# Patient Record
Sex: FEMALE | NOT HISPANIC OR LATINO | Employment: FULL TIME | URBAN - METROPOLITAN AREA
[De-identification: names, ages, dates, MRNs, and addresses within clinical notes are randomized per-mention and may not be internally consistent; named-entity substitution may affect disease eponyms.]

---

## 2024-02-27 ENCOUNTER — OFFICE VISIT (OUTPATIENT)
Dept: OBSTETRICS AND GYNECOLOGY | Facility: CLINIC | Age: 44
End: 2024-02-27
Payer: COMMERCIAL

## 2024-02-27 VITALS
HEIGHT: 70 IN | WEIGHT: 180 LBS | SYSTOLIC BLOOD PRESSURE: 110 MMHG | DIASTOLIC BLOOD PRESSURE: 86 MMHG | BODY MASS INDEX: 25.77 KG/M2

## 2024-02-27 DIAGNOSIS — Z01.419 WELL WOMAN EXAM WITH ROUTINE GYNECOLOGICAL EXAM: Primary | ICD-10-CM

## 2024-02-27 DIAGNOSIS — Z12.31 BREAST CANCER SCREENING BY MAMMOGRAM: ICD-10-CM

## 2024-02-27 PROCEDURE — 99396 PREV VISIT EST AGE 40-64: CPT | Performed by: OBSTETRICS & GYNECOLOGY

## 2024-02-27 PROCEDURE — 1036F TOBACCO NON-USER: CPT | Performed by: OBSTETRICS & GYNECOLOGY

## 2024-02-27 RX ORDER — MULTIVITAMIN
TABLET ORAL
COMMUNITY

## 2024-02-27 RX ORDER — CHOLECALCIFEROL (VITAMIN D3) 50 MCG
TABLET ORAL
COMMUNITY

## 2024-02-27 NOTE — PROGRESS NOTES
"Subjective   Chinaludivina Nguyen is a 43 y.o. female who is here for a routine well woman exam.     PAP: 2/11/22 NEG HPV NEG   6/25/18 NEG HPV NEG  COLPO 1/8/16 TRACIE 1  MAMMO 3/28/22 NEG  DEXA NEVER  COLON NEVER     Doing well, no significant medical changes since last year.  Still working at [employer].  Kids are doing well.    Periods are still regular, not too heavy other than first day. No pain.   has vasectomy.    No new breast changes.      ROS:  Constitutional: Negative  Eyes: Negative  HEENT: Negative  Respiratory: Negative  Cardiovascular: Negative  GI: Negative  : Negative  Endocrine: Negative  Breast: Negative  Musculoskeletal: Negative  Skin: Negative  Hematologic: Negative  Neurologic: Negative  Psychiatric: Negative     Objective   /86   Ht 1.778 m (5' 10\")   Wt 81.6 kg (180 lb)   LMP 02/20/2024 (Exact Date)   BMI 25.83 kg/m²     Constitutional: Alert and in no acute distress.   Pulmonary: No respiratory distress.   Chest: Breasts: Normal appearance, no nipple discharge, and no skin changes. Palpation of breasts and axillae: No palpable mass and no axillary lymphadenopathy.  Abdomen: Soft, nontender; no abdominal mass palpated.   Genitourinary:   External genitalia: Normal appearance.  No inguinal lymphadenopathy.   Bartholin's, Urethral, and Skenes Glands: Normal.   Urethra: Normal. Bladder: Normal on palpation.   Vagina: Normal.   Cervix: Normal appearance, nontender.   Uterus/Adnexa: Normal size, mobile uterus. Nontender, no masses palpated in adnexa  Inspection of perianal area: Normal.  Musculoskeletal: No joint swelling seen, normal movements of all extremities.  Skin: Normal skin color and pigmentation, normal skin turgor, and no rash.  Psychiatric: Alert and oriented x 3. Affect normal to patient's baseline. Mood: Appropriate.       Assessment/Plan   1. Well woman exam with routine gynecological exam  2. Breast cancer screening by mammogram  - BI mammo bilateral screening " tomosynthesis; Future    Breast and pelvic exam findings today were normal.  Pap testing not done today. Last pap negative. Pap due in 2025.  She was given an order for her next screening mammogram.  Vasectomy for contraception.  Advised her to call with any new problems or questions.  Follow up in 1 year for the next well woman visit.

## 2024-02-27 NOTE — PATIENT INSTRUCTIONS
Routine Gynecologic Visit:  You were seen today for a routine gyn visit with normal findings on exam  Your pap smear had normal cells and was negative for HPV in 2022, so you were not due for a pap smear today. You should still continue to get annual breast and pelvic exams in the office.  An order was placed in the system for mammogram. Please get done at your earliest convenience  If you are having any gynecologic issues in the next year you should call the office. [phone] ([place]) or [phone] ([place])

## 2024-05-02 ENCOUNTER — HOSPITAL ENCOUNTER (OUTPATIENT)
Dept: RADIOLOGY | Facility: CLINIC | Age: 44
Discharge: HOME | End: 2024-05-02
Payer: COMMERCIAL

## 2024-05-02 VITALS — BODY MASS INDEX: 23.52 KG/M2 | WEIGHT: 168 LBS | HEIGHT: 71 IN

## 2024-05-02 DIAGNOSIS — Z12.31 BREAST CANCER SCREENING BY MAMMOGRAM: ICD-10-CM

## 2024-05-02 PROCEDURE — 77063 BREAST TOMOSYNTHESIS BI: CPT | Performed by: RADIOLOGY

## 2024-05-02 PROCEDURE — 77067 SCR MAMMO BI INCL CAD: CPT | Performed by: RADIOLOGY

## 2024-05-02 PROCEDURE — 77067 SCR MAMMO BI INCL CAD: CPT

## 2024-05-20 ENCOUNTER — TELEPHONE (OUTPATIENT)
Dept: OBSTETRICS AND GYNECOLOGY | Facility: CLINIC | Age: 44
End: 2024-05-20
Payer: COMMERCIAL

## 2024-05-20 DIAGNOSIS — Z91.89 AT HIGH RISK FOR BREAST CANCER: ICD-10-CM

## 2024-05-20 DIAGNOSIS — R92.30 DENSE BREAST TISSUE: Primary | ICD-10-CM

## 2024-05-22 NOTE — TELEPHONE ENCOUNTER
Spoke with [place], reviewed her mammogram. Benign findings, but lifetime breast cancer risk is high at 32%.   Recommend referral to the high risk breast clinic and she agrees. Order placed for this and she was given the number to call for scheduling.

## 2025-03-10 ENCOUNTER — APPOINTMENT (OUTPATIENT)
Dept: OBSTETRICS AND GYNECOLOGY | Facility: CLINIC | Age: 45
End: 2025-03-10
Payer: COMMERCIAL

## 2025-03-10 VITALS
WEIGHT: 157 LBS | SYSTOLIC BLOOD PRESSURE: 124 MMHG | HEIGHT: 70 IN | DIASTOLIC BLOOD PRESSURE: 76 MMHG | BODY MASS INDEX: 22.48 KG/M2

## 2025-03-10 DIAGNOSIS — Z01.419 WELL WOMAN EXAM WITH ROUTINE GYNECOLOGICAL EXAM: Primary | ICD-10-CM

## 2025-03-10 DIAGNOSIS — Z12.31 ENCOUNTER FOR SCREENING MAMMOGRAM FOR MALIGNANT NEOPLASM OF BREAST: ICD-10-CM

## 2025-03-10 DIAGNOSIS — Z12.4 ROUTINE CERVICAL SMEAR: ICD-10-CM

## 2025-03-10 PROCEDURE — 99396 PREV VISIT EST AGE 40-64: CPT | Performed by: OBSTETRICS & GYNECOLOGY

## 2025-03-10 PROCEDURE — 3008F BODY MASS INDEX DOCD: CPT | Performed by: OBSTETRICS & GYNECOLOGY

## 2025-03-10 PROCEDURE — 87624 HPV HI-RISK TYP POOLED RSLT: CPT

## 2025-03-10 PROCEDURE — 1036F TOBACCO NON-USER: CPT | Performed by: OBSTETRICS & GYNECOLOGY

## 2025-03-10 NOTE — PROGRESS NOTES
"Subjective   Chinaludivina Nguyen is a 44 y.o. female who is here for a routine well woman exam.     PAP: 2/11/22 NEG HPV NEG   6/25/18 NEG HPV NEG  COLPO 1/8/16 TRACIE 1  MAMMO 5/2/24 NEG  DEXA NEVER  COLON NEVER     Doing well, no medical changes since last year.  Still working at [employer].  Kids are doing well.     Periods are still regular, most of the time not too heavy except for the last one. First couple days were sudden, heavy bleeding much more than usual. No pain.    Also noticed she is sweating a lot more than usual at times. Sometimes more likely before her period.    She keeps thinking about getting a salpingectomy procedure for permanent contraception, thinks she is ready to proceed.     No new breast changes.  Alternates mammogram and MRI.       ROS:  Constitutional: Negative  Eyes: Negative  HEENT: Negative  Respiratory: Negative  Cardiovascular: Negative  GI: Negative  : Negative  Endocrine: Negative  Breast: Negative  Musculoskeletal: Negative  Skin: Negative  Hematologic: Negative  Neurologic: Negative  Psychiatric: Negative     Objective   /76   Ht 1.778 m (5' 10\")   Wt 71.2 kg (157 lb)   LMP 02/24/2025 (Exact Date)   BMI 22.53 kg/m²     Constitutional: Alert and in no acute distress.     Pulmonary: No respiratory distress.     Chest: Breasts: Normal appearance, no nipple discharge, and no skin changes. Palpation of breasts and axillae: No palpable mass and no axillary lymphadenopathy.    Abdomen: Soft, nontender; no abdominal mass palpated.     Genitourinary:   External genitalia: Normal appearance.  No inguinal lymphadenopathy.   Bartholin's, Urethral, and Skenes Glands: Normal.   Urethra: Normal. Bladder: Normal on palpation.   Vagina: Normal.   Cervix: Normal appearance, nontender.   Uterus/Adnexa: Normal size, mobile uterus. Nontender, no masses palpated in adnexa  Inspection of perianal area: Normal.    Musculoskeletal: No joint swelling seen, normal movements of all " extremities.    Skin: Normal skin color and pigmentation, normal skin turgor, and no rash.    Psychiatric: Alert and oriented x 3. Affect normal to patient's baseline. Mood: Appropriate.       Assessment/Plan   1. Well woman exam with routine gynecological exam (Primary)  2. Routine cervical smear  - THINPREP PAP TEST  - HPV DNA High Risk With Genotype  3. Encounter for screening mammogram for malignant neoplasm of breast  - BI mammo bilateral screening tomosynthesis; Future    Breast and pelvic exam done today.  Pap and HPV screening ordered.   She was given an order for her next mammogram.  She wants to proceed with scheduling salpingectomy.  Advised her to call with any new problems or questions.  Follow up in 1 year for the next well woman visit.

## 2025-03-11 PROBLEM — M51.369 DEGENERATION OF INTERVERTEBRAL DISC OF LUMBAR REGION: Status: RESOLVED | Noted: 2025-03-11 | Resolved: 2025-03-11

## 2025-04-18 ENCOUNTER — HOSPITAL ENCOUNTER (OUTPATIENT)
Facility: HOSPITAL | Age: 45
Setting detail: OUTPATIENT SURGERY
End: 2025-04-18
Attending: OBSTETRICS & GYNECOLOGY | Admitting: OBSTETRICS & GYNECOLOGY
Payer: COMMERCIAL

## 2025-04-18 ENCOUNTER — PREP FOR PROCEDURE (OUTPATIENT)
Dept: OBSTETRICS AND GYNECOLOGY | Facility: HOSPITAL | Age: 45
End: 2025-04-18
Payer: COMMERCIAL

## 2025-04-18 DIAGNOSIS — Z30.2 ENCOUNTER FOR STERILIZATION: Primary | ICD-10-CM

## 2025-04-18 RX ORDER — ACETAMINOPHEN 325 MG/1
975 TABLET ORAL ONCE
OUTPATIENT
Start: 2025-04-18 | End: 2025-04-18

## 2025-04-21 ENCOUNTER — TELEPHONE (OUTPATIENT)
Dept: OBSTETRICS AND GYNECOLOGY | Facility: HOSPITAL | Age: 45
End: 2025-04-21
Payer: COMMERCIAL

## 2025-06-11 ENCOUNTER — APPOINTMENT (OUTPATIENT)
Dept: RADIOLOGY | Facility: CLINIC | Age: 45
End: 2025-06-11
Payer: COMMERCIAL

## 2025-06-12 ENCOUNTER — APPOINTMENT (OUTPATIENT)
Dept: OBSTETRICS AND GYNECOLOGY | Facility: CLINIC | Age: 45
End: 2025-06-12
Payer: COMMERCIAL

## 2025-06-25 ENCOUNTER — HOSPITAL ENCOUNTER (OUTPATIENT)
Dept: RADIOLOGY | Facility: HOSPITAL | Age: 45
Discharge: HOME | End: 2025-06-25
Payer: COMMERCIAL

## 2025-06-25 VITALS — BODY MASS INDEX: 22.48 KG/M2 | HEIGHT: 70 IN | WEIGHT: 157 LBS

## 2025-06-25 DIAGNOSIS — Z12.31 ENCOUNTER FOR SCREENING MAMMOGRAM FOR MALIGNANT NEOPLASM OF BREAST: ICD-10-CM

## 2025-06-25 PROCEDURE — 77063 BREAST TOMOSYNTHESIS BI: CPT | Performed by: STUDENT IN AN ORGANIZED HEALTH CARE EDUCATION/TRAINING PROGRAM

## 2025-06-25 PROCEDURE — 77067 SCR MAMMO BI INCL CAD: CPT | Performed by: STUDENT IN AN ORGANIZED HEALTH CARE EDUCATION/TRAINING PROGRAM

## 2025-06-25 PROCEDURE — 77063 BREAST TOMOSYNTHESIS BI: CPT

## 2025-06-26 ENCOUNTER — TELEPHONE (OUTPATIENT)
Dept: OBSTETRICS AND GYNECOLOGY | Facility: CLINIC | Age: 45
End: 2025-06-26
Payer: COMMERCIAL

## 2025-06-27 DIAGNOSIS — R92.8 ABNORMALITY OF BOTH BREASTS ON SCREENING MAMMOGRAM: Primary | ICD-10-CM

## 2025-07-01 ENCOUNTER — HOSPITAL ENCOUNTER (OUTPATIENT)
Dept: RADIOLOGY | Facility: CLINIC | Age: 45
Discharge: HOME | End: 2025-07-01
Payer: COMMERCIAL

## 2025-07-01 DIAGNOSIS — R92.8 ABNORMALITY OF BOTH BREASTS ON SCREENING MAMMOGRAM: ICD-10-CM

## 2025-07-01 PROCEDURE — 77066 DX MAMMO INCL CAD BI: CPT | Performed by: RADIOLOGY

## 2025-07-01 PROCEDURE — 76642 ULTRASOUND BREAST LIMITED: CPT | Performed by: RADIOLOGY

## 2025-07-01 PROCEDURE — 77062 BREAST TOMOSYNTHESIS BI: CPT

## 2025-07-01 PROCEDURE — 76642 ULTRASOUND BREAST LIMITED: CPT

## 2025-07-01 PROCEDURE — 76982 USE 1ST TARGET LESION: CPT

## 2025-07-01 PROCEDURE — 77062 BREAST TOMOSYNTHESIS BI: CPT | Performed by: RADIOLOGY

## 2025-08-07 ENCOUNTER — APPOINTMENT (OUTPATIENT)
Dept: OBSTETRICS AND GYNECOLOGY | Facility: CLINIC | Age: 45
End: 2025-08-07
Payer: COMMERCIAL

## 2025-08-07 VITALS
WEIGHT: 157 LBS | SYSTOLIC BLOOD PRESSURE: 136 MMHG | DIASTOLIC BLOOD PRESSURE: 88 MMHG | BODY MASS INDEX: 22.48 KG/M2 | HEIGHT: 70 IN

## 2025-08-07 DIAGNOSIS — Z30.09 ENCOUNTER FOR GENERAL COUNSELING AND ADVICE ON CONTRACEPTIVE MANAGEMENT: ICD-10-CM

## 2025-08-07 DIAGNOSIS — R10.2 PELVIC PAIN: Primary | ICD-10-CM

## 2025-08-07 PROCEDURE — 99214 OFFICE O/P EST MOD 30 MIN: CPT | Performed by: OBSTETRICS & GYNECOLOGY

## 2025-08-07 PROCEDURE — 1036F TOBACCO NON-USER: CPT | Performed by: OBSTETRICS & GYNECOLOGY

## 2025-08-07 PROCEDURE — 3008F BODY MASS INDEX DOCD: CPT | Performed by: OBSTETRICS & GYNECOLOGY

## 2025-08-07 NOTE — PROGRESS NOTES
"Subjective   Patient ID: China Nguyen \"Gely\" is a 44 y.o. female who presents for Pelvic Pain.    HPI  She was seen for her annual exam in March.   Regular periods, no pain at that time.    Pain started in May. Diagnosed with Fifths' desease. On period and worse cramping.  This went away, then in June and July had mid-cycle pain that lasted for a few days.  Currently having pain again, started a couple days ago and due for period soon.  Pain is in Right lower side, sharp, off and on. Also in same area in buttock and lower back.   Worried about ovaries.  Normal bowels. No bladder issues.    Getting . Not with a new partner now, but worried that if she were does not want to be pregnant.   At last visit had discussed salpingectomy, but this was not done.  Wants to go over other options.    Review of Systems   Genitourinary:  Positive for pelvic pain.   All other systems reviewed and are negative.    Objective   Physical Exam  Constitutional: Alert and in no acute distress.     Pulmonary: No respiratory distress.     Abdomen: Soft, nontender; no abdominal mass palpated.     Genitourinary:   External genitalia: Normal appearance.  No inguinal lymphadenopathy.   Cervix: Nontender.   Uterus/Adnexa: Normal size, mobile uterus. Nontender, no masses palpated in adnexa  Inspection of perianal area: Normal.    Psychiatric: Alert and oriented x 3. Affect normal to patient's baseline. Mood: Appropriate.    Assessment/Plan   Diagnoses and all orders for this visit:  Pelvic pain  -     US PELVIS TRANSABDOMINAL WITH TRANSVAGINAL; Future  Encounter for general counseling and advice on contraceptive management     Discussed possible reasons for her pelvic pain. Could be related to the ovaries or some other gynecologic issues.  Or, it may be musculoskeletal, bowel or urinary.  Will have her get a pelvic ultrasound to assess the ovaries and uterus, order for this placed. I will call with results.    Also discussed birth " control options. Went over pill, ring, patch, IUDs, Nexplanon.   She is possibly interested in getting the Nexplanon. Will check insurance and let her know, then she can return for insertion.    Luana Zafar MD 08/07/25 1:28 PM

## 2025-08-08 ENCOUNTER — HOSPITAL ENCOUNTER (OUTPATIENT)
Dept: RADIOLOGY | Facility: HOSPITAL | Age: 45
Discharge: HOME | End: 2025-08-08
Payer: COMMERCIAL

## 2025-08-08 DIAGNOSIS — R10.2 PELVIC PAIN: ICD-10-CM

## 2025-08-08 PROCEDURE — 76856 US EXAM PELVIC COMPLETE: CPT

## 2025-08-14 ENCOUNTER — TELEPHONE (OUTPATIENT)
Dept: OBSTETRICS AND GYNECOLOGY | Facility: CLINIC | Age: 45
End: 2025-08-14
Payer: COMMERCIAL